# Patient Record
Sex: MALE
[De-identification: names, ages, dates, MRNs, and addresses within clinical notes are randomized per-mention and may not be internally consistent; named-entity substitution may affect disease eponyms.]

---

## 2018-06-15 ENCOUNTER — HOSPITAL ENCOUNTER (EMERGENCY)
Dept: HOSPITAL 14 - H.ER | Age: 43
Discharge: HOME | End: 2018-06-15
Payer: MEDICAID

## 2018-06-15 VITALS — TEMPERATURE: 97.9 F

## 2018-06-15 DIAGNOSIS — K29.70: Primary | ICD-10-CM

## 2018-06-15 NOTE — ED PDOC
HPI: Abdomen


Time Seen by Provider: 06/15/18 19:35


Chief Complaint (Nursing): Abdominal Pain


History Per: Patient


History/Exam Limitations: no limitations


Onset/Duration Of Symptoms: Hrs


Current Symptoms Are (Timing): Still Present


Context: Food


Location Of Pain/Discomfort: Epigastric


Additional Complaint(s): 





Hx of possible H. Pylori (patient unsure, states he was treated with a "stomach

" infection one year ago after endoscopy) presenting with epigastric burning 

pain, worsening after eating.  States he's been taking motrin 800mg daily for 

the past 2 weeks for dental pain status post extraction.  Denies fevers, nausea

, vomiting, diarrhea, abnormal or black stools.  States he feels better sitting 

up or when not eating.





Past Medical History


Reviewed: Historical Data, Nursing Documentation, Vital Signs


Vital Signs: 


 Last Vital Signs











Temp  97.9 F   06/15/18 19:03


 


Pulse  62   06/15/18 22:30


 


Resp  18   06/15/18 22:30


 


BP  151/96 H  06/15/18 22:30


 


Pulse Ox  99   06/15/18 22:30














- Medical History


PMH: Gastritis (H. Pylori?)





- Surgical History


Surgical History: No Surg Hx





- Family History


Family History: States: Unknown Family Hx





- Home Medications


Home Medications: 


 Ambulatory Orders











 Medication  Instructions  Recorded


 


Omeprazole 20 mg PO DAILY #30 capsule. 06/15/18














- Allergies


Allergies/Adverse Reactions: 


 Allergies











Allergy/AdvReac Type Severity Reaction Status Date / Time


 


No Known Allergies Allergy   Verified 10/06/14 09:57














Review of Systems


ROS Statement: Except As Marked, All Systems Reviewed And Found Negative


Gastrointestinal: Positive for: Abdominal Pain





Physical Exam





- Reviewed


Nursing Documentation Reviewed: Yes


Vital Signs Reviewed: Yes





- Physical Exam


Appears: Positive for: Well, Non-toxic, No Acute Distress


Head Exam: Positive for: ATRAUMATIC, NORMAL INSPECTION, NORMOCEPHALIC


Skin: Positive for: Normal Color, Warm, DRY


Eye Exam: Positive for: EOMI, Normal appearance, PERRL


ENT: Positive for: Normal ENT Inspection


Neck: Positive for: Normal, Painless ROM


Cardiovascular/Chest: Positive for: Regular Rate, Rhythm


Respiratory: Positive for: CNT, Normal Breath Sounds


Gastrointestinal/Abdominal: Positive for: Bowel Sounds, Soft, Tenderness (Mild 

epigastric tenderness).  Negative for: Organomegaly, Mass, Distended, Guarding, 

Rebound


Back: Positive for: Normal Inspection


Extremity: Positive for: Normal ROM


Neurologic/Psych: Positive for: Alert, Oriented





- ECG


O2 Sat by Pulse Oximetry: 98


Pulse Ox Interpretation: Normal





Medical Decision Making


Medical Decision MakinPM


A/P: 


Hx of possible H Pylori in the past presenting with burning epigastric pain 

after taking NSAID


-patient very well appearing, normal vitals, mild tenderness on exam


-most likely gastritis induced by prolonged NSAID use at high dosage, less 

likely pancreatiits, cholecystitis/cholelithiasis, not concerned for AAA, MI, 

or other cardio/cardiothoracic cause


-will give GI cocktiil and re-eval





10PM


-Patient reports some relief


-Advised to continue with PPI


-Advised to followup with GI and if no improvement or worsening or other 

concerning symptoms develop to return to ED


-patient well appearing, tolerating PO upon discharge.





Disposition





- Clinical Impression


Clinical Impression: 


 Gastritis








- Disposition


Referrals: 


Emily Vicente MD [Medical Doctor] - 


Disposition: Routine/Home


Disposition Time: 04:53


Condition: IMPROVED


Prescriptions: 


Omeprazole 20 mg PO DAILY #30 capsule.


Instructions:  Gastritis


Forms:  [facility] [facility] (English)

## 2018-06-16 VITALS — OXYGEN SATURATION: 98 %

## 2018-06-16 VITALS — RESPIRATION RATE: 18 BRPM | SYSTOLIC BLOOD PRESSURE: 151 MMHG | DIASTOLIC BLOOD PRESSURE: 96 MMHG | HEART RATE: 62 BPM

## 2019-02-02 ENCOUNTER — HOSPITAL ENCOUNTER (EMERGENCY)
Dept: HOSPITAL 14 - H.ER | Age: 44
Discharge: HOME | End: 2019-02-02
Payer: MEDICAID

## 2019-02-02 VITALS — SYSTOLIC BLOOD PRESSURE: 134 MMHG | HEART RATE: 88 BPM | DIASTOLIC BLOOD PRESSURE: 53 MMHG | RESPIRATION RATE: 18 BRPM

## 2019-02-02 VITALS — TEMPERATURE: 99 F | OXYGEN SATURATION: 98 %

## 2019-02-02 DIAGNOSIS — J11.1: ICD-10-CM

## 2019-02-02 DIAGNOSIS — K29.70: Primary | ICD-10-CM

## 2019-02-02 LAB
ALBUMIN SERPL-MCNC: 4.2 {NULL, G/DL} (ref 3.5–5)
ALBUMIN/GLOB SERPL: 1.1 {NULL, NULL} (ref 1–2.1)
ALT SERPL-CCNC: 48 {NULL, U/L} (ref 21–72)
AST SERPL-CCNC: 37 {NULL, U/L} (ref 17–59)
BASOPHILS # BLD AUTO: 0.1 {NULL, K/UL} (ref 0–0.2)
BASOPHILS NFR BLD: 1 {NULL, %} (ref 0–2)
BUN SERPL-MCNC: 16 {NULL, MG/DL} (ref 9–20)
CALCIUM SERPL-MCNC: 9.3 {NULL, MG/DL} (ref 8.4–10.2)
EOSINOPHIL # BLD AUTO: 0.4 {NULL, K/UL} (ref 0–0.7)
EOSINOPHIL NFR BLD: 4.9 {NULL, %} (ref 0–4)
ERYTHROCYTE [DISTWIDTH] IN BLOOD BY AUTOMATED COUNT: 13.4 {NULL, %} (ref 11.5–14.5)
GFR NON-AFRICAN AMERICAN: > 60 {NULL, NULL}
HGB BLD-MCNC: 14.7 {NULL, G/DL} (ref 12–18)
LIPASE SERPL-CCNC: 61 {NULL, U/L} (ref 23–300)
LYMPHOCYTES # BLD AUTO: 0.9 {NULL, K/UL} (ref 1–4.3)
LYMPHOCYTES NFR BLD AUTO: 11.3 {NULL, %} (ref 20–40)
MCH RBC QN AUTO: 30.3 {NULL, PG} (ref 27–31)
MCHC RBC AUTO-ENTMCNC: 33.4 {NULL, G/DL} (ref 33–37)
MCV RBC AUTO: 90.7 {NULL, FL} (ref 80–94)
MONOCYTES # BLD: 0.9 {NULL, K/UL} (ref 0–0.8)
MONOCYTES NFR BLD: 11.1 {NULL, %} (ref 0–10)
NEUTROPHILS # BLD: 5.6 {NULL, K/UL} (ref 1.8–7)
NEUTROPHILS NFR BLD AUTO: 71.7 {NULL, %} (ref 50–75)
NRBC BLD AUTO-RTO: 0 {NULL, %} (ref 0–0)
PLATELET # BLD: 216 {NULL, K/UL} (ref 130–400)
PMV BLD AUTO: 8.8 {NULL, FL} (ref 7.2–11.7)
RBC # BLD AUTO: 4.86 {NULL, MIL/UL} (ref 4.4–5.9)
WBC # BLD AUTO: 7.9 {NULL, K/UL} (ref 4.8–10.8)

## 2019-02-02 PROCEDURE — 99285 EMERGENCY DEPT VISIT HI MDM: CPT

## 2019-02-02 PROCEDURE — 85025 COMPLETE CBC W/AUTO DIFF WBC: CPT

## 2019-02-02 PROCEDURE — 80053 COMPREHEN METABOLIC PANEL: CPT

## 2019-02-02 PROCEDURE — 83690 ASSAY OF LIPASE: CPT

## 2019-02-02 PROCEDURE — 96374 THER/PROPH/DIAG INJ IV PUSH: CPT

## 2019-02-02 NOTE — ED PDOC
HPI: Influenza


Time Seen by Provider: 02/02/19 18:27


Chief Complaint: Abdominal Pain


Chief Complaint (Provider): Abdominal Pain


History Per: Patient


Exam Limitations: no limitations


Have you had recent travel within the past 21 days to any of: No


Onset/Duration Of Symptoms: Days (last night)


Symptoms include: fever, bodyaches.  denies: sore throat, cough, nasal congesti

on, vomiting


Sick Contacts (Context): None


Additional complaint(s):: 





Nataniel Reyes is a 43 year old male with a past medical history of gastritis, 

who presents to the emergency department complaining of chills and subjective 

fever, associated with fatigue, body aches, and epigastric abdominal pain, onset

last night. Patient states that he has had the abdominal pain before and was 

told he had gastritis. He has not seen his PMD in over a year and further 

reports that he has not had any sick contact or recent travel. Patient denies 

vomiting, diarrhea, cough, runny nose, or sore throat. 





PMD: June Duran 





Past Medical History


Reviewed: Historical Data, Nursing Documentation, Vital Signs


Vital Signs: 





                                Last Vital Signs











Temp  99.0 F   02/02/19 18:07


 


Pulse  90   02/02/19 18:07


 


Resp  16   02/02/19 18:07


 


BP  155/89 H  02/02/19 18:07


 


Pulse Ox  98   02/02/19 18:07














- Medical History


PMH: Gastritis (H. Pylori?)





- Surgical History


Surgical History: No Surg Hx





- Family History


Family History: States: Diabetes





- Home Medications


Home Medications: 


                                Ambulatory Orders











 Medication  Instructions  Recorded


 


Omeprazole 20 mg PO DAILY #30 capsule. 06/15/18


 


Acetaminophen [Tylenol Extra 1,000 mg PO Q6 PRN #100 tablet 02/02/19





Strength]  


 


Omeprazole Magnesium [Prilosec Otc] 20 mg PO DAILY #30 tcp 02/02/19


 


Oseltamivir Cap [Tamiflu] 75 mg PO BID #10 cap 02/02/19














- Allergies


Allergies/Adverse Reactions: 


                                    Allergies











Allergy/AdvReac Type Severity Reaction Status Date / Time


 


No Known Allergies Allergy   Verified 02/02/19 18:07














Review of Systems


ROS Statement: Except As Marked, All Systems Reviewed And Found Negative


Constitutional: Positive for: Fever, Chills, Other (fatigue; body aches)


ENT: Negative for: Nose Discharge, Throat Pain


Respiratory: Negative for: Cough


Gastrointestinal: Positive for: Abdominal Pain.  Negative for: Vomiting, 

Diarrhea





Physical Exam





- Reviewed


Nursing Documentation Reviewed: Yes


Vital Signs Reviewed: Yes





- Physical Exam


Appears: Positive for: Well, No Acute Distress


Head Exam: Positive for: ATRAUMATIC, NORMOCEPHALIC


Skin: Positive for: Warm, Dry


Eye Exam: Positive for: EOMI, PERRL


ENT: Negative for: Pharyngeal Erythema, Tonsillar Exudate


Neck: Positive for: Painless ROM, Supple


Cardiovascular/Chest: Positive for: Regular Rate, Rhythm.  Negative for: Murmur


Respiratory: Positive for: Normal Breath Sounds.  Negative for: Wheezing


Gastrointestinal/Abdominal: Positive for: Bowel Sounds, Soft.  Negative for: 

Tenderness, Mass, Distended, Guarding, Rebound


Back: Positive for: Normal Inspection.  Negative for: Muscle Spasm


Extremity: Positive for: Normal ROM.  Negative for: Deformity


Lymphatic: Negative for: Adenopathy


Neurologic/Psych: Positive for: Alert.  Negative for: Motor/Sensory Deficits





Medical Decision Making


Medical Decision Making: 





Time: 1900


Impression: Flu like illness


Plan: 





--CMP


--Lipase


--CBC with differential


--Tylenol 975 mg PO


--Aluminum Hydroxide/Magnesium 30 ml PO


--Pepcid 40 mg PO


--Toradol 15 mg IVP


--Lidocaine 2% Viscous 10 ml PO


--Sodium chloride 1,000 ml 


--IV insertion 


--------------------





830p Labs unremarkable. Pt feels better. DW pt findings and need for followup 

with GI. No acute conditions requiring further ER management








-------------------------

------------------------------------------------------------------------





Scribe Attestation:


Documented by Bryce Aguila, acting as a scribe for Anjali Pablo MD.





Provider Scribe Attestation:


All medical record entries made by the Scribe were at my direction and 

personally dictated by me. I have reviewed the chart and agree that the record 

accurately reflects my personal performance of the history, physical exam, 

medical decision making, and the department course for this patient. I have also

 personally directed, reviewed, and agree with the discharge instructions and 

disposition.








- Laboratory Results


Result Diagrams: 


                                 02/02/19 19:43





                                 02/02/19 19:43





- ECG


O2 Sat by Pulse Oximetry: 98 (RA)


Pulse Ox Interpretation: Normal





Disposition





- Clinical Impression


Clinical Impression: 


 Influenza-like symptoms, Gastritis








- Disposition


Referrals: 


Rubina Duran MD [Family Provider] - 


Disposition: Routine/Home


Disposition Time: 20:30


Condition: IMPROVED


Prescriptions: 


Acetaminophen [Tylenol Extra Strength] 1,000 mg PO Q6 PRN #100 tablet


 PRN Reason: FEVER OR PAIN


Omeprazole Magnesium [Prilosec Otc] 20 mg PO DAILY #30 tcp


Oseltamivir Cap [Tamiflu] 75 mg PO BID #10 cap


Instructions:  Gastritis (DC), Viral Syndrome (DC)


Forms:  [facility] ED School/Work Excuse